# Patient Record
Sex: FEMALE | URBAN - METROPOLITAN AREA
[De-identification: names, ages, dates, MRNs, and addresses within clinical notes are randomized per-mention and may not be internally consistent; named-entity substitution may affect disease eponyms.]

---

## 2024-08-22 ENCOUNTER — TELEPHONE (OUTPATIENT)
Dept: DERMATOLOGY | Facility: CLINIC | Age: 59
End: 2024-08-22

## 2024-08-22 NOTE — TELEPHONE ENCOUNTER
[facility] Call Center    Phone Message    May a detailed message be left on voicemail: yes     Reason for Call: kin lesion or changing mole on leg. This is growing, changing multicolor and layers. Per protocols states send encounter for clinic review. Thanks     Action Taken: Message routed to:  Other: [place] derm    Travel Screening: Not Applicable     Date of Service:

## 2024-08-23 NOTE — TELEPHONE ENCOUNTER
Spoke to pt and she was uncomfortable waiting until September to be seen. She would like to set up mychart and send picture to see if waiting is ok. Link sent to pt to set up mychart.   Jody FERGUSON RN BSN PHN  [facility]

## 2024-08-23 NOTE — TELEPHONE ENCOUNTER
Patient Contact    Attempt # 1    Was call answered?  No    Left message for pt to call back and schedule or she could check with her insurance whjere all she could be seen for derm.      [facility]   [phone]